# Patient Record
Sex: FEMALE | Race: BLACK OR AFRICAN AMERICAN | NOT HISPANIC OR LATINO | URBAN - METROPOLITAN AREA
[De-identification: names, ages, dates, MRNs, and addresses within clinical notes are randomized per-mention and may not be internally consistent; named-entity substitution may affect disease eponyms.]

---

## 2017-08-13 ENCOUNTER — EMERGENCY (EMERGENCY)
Facility: HOSPITAL | Age: 57
LOS: 1 days | Discharge: HOME | End: 2017-08-13
Admitting: EMERGENCY MEDICINE
Payer: MEDICARE

## 2017-08-13 VITALS
WEIGHT: 240.08 LBS | RESPIRATION RATE: 18 BRPM | OXYGEN SATURATION: 96 % | HEIGHT: 67 IN | TEMPERATURE: 98 F | DIASTOLIC BLOOD PRESSURE: 83 MMHG | HEART RATE: 77 BPM | SYSTOLIC BLOOD PRESSURE: 139 MMHG

## 2017-08-13 DIAGNOSIS — Z79.899 OTHER LONG TERM (CURRENT) DRUG THERAPY: ICD-10-CM

## 2017-08-13 DIAGNOSIS — Z88.2 ALLERGY STATUS TO SULFONAMIDES: ICD-10-CM

## 2017-08-13 DIAGNOSIS — Z88.8 ALLERGY STATUS TO OTHER DRUGS, MEDICAMENTS AND BIOLOGICAL SUBSTANCES STATUS: ICD-10-CM

## 2017-08-13 DIAGNOSIS — Z76.0 ENCOUNTER FOR ISSUE OF REPEAT PRESCRIPTION: ICD-10-CM

## 2017-08-13 DIAGNOSIS — E11.9 TYPE 2 DIABETES MELLITUS WITHOUT COMPLICATIONS: ICD-10-CM

## 2017-08-13 PROCEDURE — 99283 EMERGENCY DEPT VISIT LOW MDM: CPT

## 2017-08-13 RX ORDER — HALOPERIDOL DECANOATE 100 MG/ML
1 INJECTION INTRAMUSCULAR
Qty: 2 | Refills: 0 | OUTPATIENT
Start: 2017-08-13

## 2017-08-13 NOTE — ED ADULT TRIAGE NOTE - CHIEF COMPLAINT QUOTE
" I need a haldol prescription for 2 days  because [facility] is closed . " Denies any depression , anxiety , hearing voices nor SI.

## 2017-08-13 NOTE — ED PROVIDER NOTE - MEDICAL DECISION MAKING DETAILS
pt out of Haldol, and her pharmacy is closed today.  no e/o acute psychosis.  will give 2 day supply at alternate pharmacy, and she will  her meds at her usual pharmacy in 1-2 days.

## 2017-08-13 NOTE — ED PROVIDER NOTE - OBJECTIVE STATEMENT
pt with Hx of DM, schizophrenia, and OA - ran out of Haldol and her usual pharmacy where the prescription is awaiting pick-up is closed today.  she is requesting a 2-day supply, in case she is not able to get there tomorrow (she has a new job).  she says she has been compliant with her medications, and denies any SI/HI; she says she lives with occasional AH despite compliance with meds, but no voices suggesting harm to self/others. She says this is her baseline and she has been doing well in that regard.      She has her diabetes meds, and sugars running recently 170s-low 200s.  She denies any weakness, polyuria or polydipsia.

## 2017-08-13 NOTE — ED PROVIDER NOTE - PHYSICAL EXAMINATION
GEN: awake, alert, NAD  EYES: Clear, Pupils equal and round.  PULM: Lungs clear  CV: RRR S1S2  GI: Abd soft, nontender  MSK: CHISHOLM without difficulty  SKIN: normal color and turgor, no rash  NEURO: Alert, oriented. No gross movement abnormalities.  Speech clear.  Gait steady.

## 2017-08-13 NOTE — ED PROVIDER NOTE - NS ED ROS FT
CONST:  no fever/chills  NEURO: no headache or dizziness, no weakness  CARDIO: no chest pain  PULM: no dyspnea   GI: no abdominal pain, nausea or vomiting  ENDOCR: no polyuria, no polydipsia.

## 2018-11-12 PROBLEM — M19.90 UNSPECIFIED OSTEOARTHRITIS, UNSPECIFIED SITE: Chronic | Status: ACTIVE | Noted: 2017-08-13

## 2018-11-12 PROBLEM — E11.9 TYPE 2 DIABETES MELLITUS WITHOUT COMPLICATIONS: Chronic | Status: ACTIVE | Noted: 2017-08-13

## 2018-11-12 PROBLEM — F20.9 SCHIZOPHRENIA, UNSPECIFIED: Chronic | Status: ACTIVE | Noted: 2017-08-13

## 2018-11-30 ENCOUNTER — APPOINTMENT (OUTPATIENT)
Dept: OTOLARYNGOLOGY | Facility: CLINIC | Age: 58
End: 2018-11-30

## 2021-06-29 ENCOUNTER — APPOINTMENT (OUTPATIENT)
Dept: DERMATOLOGY | Facility: CLINIC | Age: 61
End: 2021-06-29

## 2021-07-21 ENCOUNTER — APPOINTMENT (OUTPATIENT)
Dept: DERMATOLOGY | Facility: CLINIC | Age: 61
End: 2021-07-21

## 2022-08-02 ENCOUNTER — APPOINTMENT (OUTPATIENT)
Dept: RHEUMATOLOGY | Facility: CLINIC | Age: 62
End: 2022-08-02

## 2024-04-08 ENCOUNTER — APPOINTMENT (OUTPATIENT)
Dept: HEART AND VASCULAR | Facility: CLINIC | Age: 64
End: 2024-04-08

## 2024-04-15 ENCOUNTER — APPOINTMENT (OUTPATIENT)
Dept: OBGYN | Facility: CLINIC | Age: 64
End: 2024-04-15

## 2024-05-13 ENCOUNTER — APPOINTMENT (OUTPATIENT)
Dept: OBGYN | Facility: CLINIC | Age: 64
End: 2024-05-13

## 2024-07-23 ENCOUNTER — APPOINTMENT (OUTPATIENT)
Dept: OTOLARYNGOLOGY | Facility: CLINIC | Age: 64
End: 2024-07-23

## 2024-08-20 ENCOUNTER — APPOINTMENT (OUTPATIENT)
Dept: NEUROLOGY | Facility: CLINIC | Age: 64
End: 2024-08-20

## 2024-09-06 ENCOUNTER — APPOINTMENT (OUTPATIENT)
Dept: NEUROLOGY | Facility: CLINIC | Age: 64
End: 2024-09-06
Payer: MEDICAID

## 2024-09-06 ENCOUNTER — NON-APPOINTMENT (OUTPATIENT)
Age: 64
End: 2024-09-06

## 2024-09-06 VITALS
BODY MASS INDEX: 34.49 KG/M2 | OXYGEN SATURATION: 97 % | DIASTOLIC BLOOD PRESSURE: 74 MMHG | SYSTOLIC BLOOD PRESSURE: 136 MMHG | TEMPERATURE: 97.9 F | HEIGHT: 65 IN | WEIGHT: 207 LBS | HEART RATE: 79 BPM

## 2024-09-06 DIAGNOSIS — I63.9 CEREBRAL INFARCTION, UNSPECIFIED: ICD-10-CM

## 2024-09-06 PROCEDURE — 99204 OFFICE O/P NEW MOD 45 MIN: CPT

## 2024-09-06 RX ORDER — ALBUTEROL 90 MCG
90 AEROSOL (GRAM) INHALATION EVERY 6 HOURS
Refills: 0 | Status: ACTIVE | COMMUNITY

## 2024-09-06 RX ORDER — HALOPERIDOL 5 MG/1
5 TABLET ORAL
Refills: 0 | Status: ACTIVE | COMMUNITY

## 2024-09-06 NOTE — ASSESSMENT
[FreeTextEntry1] : The patient is a 63 year old female with T2DM, HTN, HLD and schizophrenia. She presents for evaluation of a clinical event and exam concerning for stroke. Etiology unclear so further eval is warranted.  PLAN --MRI brain w/wo contrast; MRA head/neck --TTE; may need NOE --30 day cardiac monitor; may need ILR --labs --Continue ASA but at 325 mg daily --Increase atorvastatin to 40 mg daily --Vascular RF control with PCP (provided new list of potential PCP within [facility] system)

## 2024-09-06 NOTE — HISTORY OF PRESENT ILLNESS
[FreeTextEntry1] : The patient is a 63 year old female with T2DM, HTN, HLD and schizophrenia. She presents for evaluation of stroke.  The patient states around 3 weeks ago, she developed sudden onset numbness/tingling of the right face/arm/leg as well as a facial droop.  The facial droop lasted only seconds but the numbness persisted for a few minutes.  She went to a local ER.  They did a head CT and a CTA head and neck which were unremarkable.  She was told she had a stroke, however. She has not been seen by her PCP or other physician. She has had no recurrent symptoms but does feel her speech is dysarthric and her cognitive is "off" since the event.  She increased her baby aspirin to 500 mg daily on her own. (She was told by her sister to start taking baby aspirin in the past and otherwise denies any clinical indication.)  She otherwise takes atorvastatin 20 mg daily.  She is unsure of her recent lipid panel results but does believe her recent A1c was 7.6%.  She is a never smoker and does not use substances or drink alcohol.  She has a family history of stroke in 2 brothers and a sister in their 70s.

## 2024-09-06 NOTE — PHYSICAL EXAM
[FreeTextEntry1] :   Mental status: Awake, alert and oriented x3.  Recent and remote memory intact.  Naming, repetition and comprehension intact.  Attention/concentration intact. Mild/moderate dysarthria; no aphasia.  Fund of knowledge appropriate.   Cranial nerves: Pupils equally round and reactive to light, visual fields full, no nystagmus, extraocular muscles intact, V1 through V3 intact bilaterally and symmetric, face with R UMN pattern facial droop, hearing intact to finger rub, palate elevation symmetric, tongue was midline.   Motor:  MRC grading 5/5 b/l UE/LE w exception of R triceps and FE (in part related to arthritis).   strength 5/5.  Normal tone and bulk.  No abnormal movements.   Sensation: Intact to light touch in upper/lower extremities   Coordination: No dysmetria on finger-to-nose and heel-to-shin. BETH slow/ataxic on L, Mild dysdiadokinesia on L.   Reflexes: 2+ in bilateral UE/LE w reinforcement   Gait: Narrow and steady. No ataxia.

## 2024-09-16 ENCOUNTER — APPOINTMENT (OUTPATIENT)
Dept: INTERNAL MEDICINE | Facility: CLINIC | Age: 64
End: 2024-09-16

## 2024-09-17 ENCOUNTER — APPOINTMENT (OUTPATIENT)
Dept: OPHTHALMOLOGY | Facility: CLINIC | Age: 64
End: 2024-09-17

## 2024-09-17 ENCOUNTER — NON-APPOINTMENT (OUTPATIENT)
Age: 64
End: 2024-09-17

## 2024-09-17 ENCOUNTER — APPOINTMENT (OUTPATIENT)
Dept: HEART AND VASCULAR | Facility: CLINIC | Age: 64
End: 2024-09-17
Payer: MEDICAID

## 2024-09-17 VITALS
WEIGHT: 212 LBS | SYSTOLIC BLOOD PRESSURE: 116 MMHG | OXYGEN SATURATION: 95 % | HEART RATE: 72 BPM | BODY MASS INDEX: 35.32 KG/M2 | HEIGHT: 65 IN | DIASTOLIC BLOOD PRESSURE: 78 MMHG

## 2024-09-17 DIAGNOSIS — I63.9 CEREBRAL INFARCTION, UNSPECIFIED: ICD-10-CM

## 2024-09-17 DIAGNOSIS — E11.8 TYPE 2 DIABETES MELLITUS WITH UNSPECIFIED COMPLICATIONS: ICD-10-CM

## 2024-09-17 PROCEDURE — 99203 OFFICE O/P NEW LOW 30 MIN: CPT

## 2024-09-17 NOTE — REASON FOR VISIT
[Hyperlipidemia] : hyperlipidemia [Hypertension] : hypertension [FreeTextEntry1] : CV New patient visit

## 2024-09-17 NOTE — ASSESSMENT
[FreeTextEntry1] : 63 year old female with T2DM, HTN, HLD and schizophrenia. She presents for evaluation of a clinical event and exam concerning for TIA/CVA.  - Will follow TTE and holter results ordered by Dr Tillman - C/w  mg qD for 1 month, then 81 mg qD thereafter lifelong - C/w Atorva 40 qD  - EKG today in office NSR. If holter is unremarkable, then will refer for ILR of no other source for TIA/CVA is found  Remi Cottrell MD Interventional Cardiology

## 2024-09-17 NOTE — HISTORY OF PRESENT ILLNESS
[FreeTextEntry1] : 63 year old female with T2DM, HTN, HLD and schizophrenia. She presents for evaluation of a clinical event and exam concerning for TIA/CVA.  Patient states several months ago she had right sided facial numbness and droop that resolved on its own while she was at work. 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 Neurologic deficits resolved without intervention but she still sought ED care - CTH and CTA H/N negative for large vessel occlusion. No recurrence of symptoms.   Saw neurology who ordered MRI brain, TTE, Holter which she hasnt scheduled or started yet.  Taking  mg qD and Atorva 40 qD for ASCVD.

## 2024-09-20 RX ORDER — ATORVASTATIN CALCIUM 20 MG/1
20 TABLET, FILM COATED ORAL DAILY
Refills: 0 | Status: ACTIVE | COMMUNITY

## 2024-09-23 ENCOUNTER — APPOINTMENT (OUTPATIENT)
Dept: INTERNAL MEDICINE | Facility: CLINIC | Age: 64
End: 2024-09-23

## 2024-09-24 ENCOUNTER — APPOINTMENT (OUTPATIENT)
Dept: HEART AND VASCULAR | Facility: CLINIC | Age: 64
End: 2024-09-24

## 2024-11-05 ENCOUNTER — APPOINTMENT (OUTPATIENT)
Dept: MRI IMAGING | Facility: CLINIC | Age: 64
End: 2024-11-05

## 2024-11-25 ENCOUNTER — APPOINTMENT (OUTPATIENT)
Dept: NEUROLOGY | Facility: CLINIC | Age: 64
End: 2024-11-25

## 2024-11-25 ENCOUNTER — OUTPATIENT (OUTPATIENT)
Dept: OUTPATIENT SERVICES | Facility: HOSPITAL | Age: 64
LOS: 1 days | End: 2024-11-25

## 2024-11-25 ENCOUNTER — APPOINTMENT (OUTPATIENT)
Dept: MRI IMAGING | Facility: CLINIC | Age: 64
End: 2024-11-25

## 2024-11-25 ENCOUNTER — APPOINTMENT (OUTPATIENT)
Dept: MRI IMAGING | Facility: CLINIC | Age: 64
End: 2024-11-25
Payer: MEDICAID

## 2024-11-25 PROCEDURE — 70547 MR ANGIOGRAPHY NECK W/O DYE: CPT | Mod: 26

## 2024-11-25 PROCEDURE — 70553 MRI BRAIN STEM W/O & W/DYE: CPT | Mod: 26

## 2024-11-25 PROCEDURE — 70544 MR ANGIOGRAPHY HEAD W/O DYE: CPT | Mod: 26,59

## 2024-11-29 ENCOUNTER — NON-APPOINTMENT (OUTPATIENT)
Age: 64
End: 2024-11-29

## 2024-12-02 ENCOUNTER — APPOINTMENT (OUTPATIENT)
Dept: HEART AND VASCULAR | Facility: CLINIC | Age: 64
End: 2024-12-02